# Patient Record
Sex: MALE | Race: WHITE | NOT HISPANIC OR LATINO | Employment: OTHER | URBAN - METROPOLITAN AREA
[De-identification: names, ages, dates, MRNs, and addresses within clinical notes are randomized per-mention and may not be internally consistent; named-entity substitution may affect disease eponyms.]

---

## 2023-10-31 ENCOUNTER — HOSPITAL ENCOUNTER (OUTPATIENT)
Dept: CARDIOLOGY | Facility: HOSPITAL | Age: 78
Discharge: HOME | End: 2023-10-31
Payer: MEDICARE

## 2023-10-31 DIAGNOSIS — I49.5 SICK SINUS SYNDROME (MULTI): ICD-10-CM

## 2023-10-31 DIAGNOSIS — Z95.0 PACEMAKER: ICD-10-CM

## 2023-10-31 DIAGNOSIS — Z95.0 PACEMAKER: Primary | ICD-10-CM

## 2023-10-31 PROCEDURE — 93294 REM INTERROG EVL PM/LDLS PM: CPT | Performed by: INTERNAL MEDICINE

## 2023-10-31 PROCEDURE — 93296 REM INTERROG EVL PM/IDS: CPT

## 2024-01-26 ENCOUNTER — ANCILLARY PROCEDURE (OUTPATIENT)
Dept: CARDIOLOGY | Facility: CLINIC | Age: 79
End: 2024-01-26
Payer: MEDICARE

## 2024-01-26 ENCOUNTER — OFFICE VISIT (OUTPATIENT)
Dept: CARDIOLOGY | Facility: CLINIC | Age: 79
End: 2024-01-26
Payer: MEDICARE

## 2024-01-26 VITALS
TEMPERATURE: 97.5 F | DIASTOLIC BLOOD PRESSURE: 60 MMHG | BODY MASS INDEX: 28.92 KG/M2 | HEIGHT: 70 IN | HEART RATE: 68 BPM | SYSTOLIC BLOOD PRESSURE: 124 MMHG | WEIGHT: 202 LBS

## 2024-01-26 DIAGNOSIS — I49.5 SINOATRIAL NODE DYSFUNCTION (MULTI): ICD-10-CM

## 2024-01-26 DIAGNOSIS — Z95.0 PACEMAKER: ICD-10-CM

## 2024-01-26 DIAGNOSIS — Z95.0 PACEMAKER: Primary | ICD-10-CM

## 2024-01-26 DIAGNOSIS — Z95.0 CARDIAC PACEMAKER IN SITU: ICD-10-CM

## 2024-01-26 DIAGNOSIS — I49.5 SICK SINUS SYNDROME (MULTI): ICD-10-CM

## 2024-01-26 PROCEDURE — 1036F TOBACCO NON-USER: CPT | Performed by: INTERNAL MEDICINE

## 2024-01-26 PROCEDURE — 93290 INTERROG DEV EVAL ICPMS IP: CPT | Performed by: INTERNAL MEDICINE

## 2024-01-26 PROCEDURE — 99203 OFFICE O/P NEW LOW 30 MIN: CPT | Performed by: INTERNAL MEDICINE

## 2024-01-26 PROCEDURE — 93280 PM DEVICE PROGR EVAL DUAL: CPT | Performed by: INTERNAL MEDICINE

## 2024-01-26 PROCEDURE — 1125F AMNT PAIN NOTED PAIN PRSNT: CPT | Performed by: INTERNAL MEDICINE

## 2024-01-26 RX ORDER — NAPROXEN SODIUM 220 MG/1
1 TABLET, FILM COATED ORAL DAILY
COMMUNITY
Start: 2021-02-26

## 2024-01-26 RX ORDER — ROSUVASTATIN CALCIUM 10 MG/1
10 TABLET, COATED ORAL DAILY
COMMUNITY

## 2024-01-26 RX ORDER — AMLODIPINE BESYLATE 10 MG/1
10 TABLET ORAL DAILY
COMMUNITY

## 2024-01-26 RX ORDER — FOLIC ACID 1 MG/1
1 TABLET ORAL DAILY
COMMUNITY
Start: 2021-02-26

## 2024-01-26 RX ORDER — NYSTATIN 100000 U/G
1 CREAM TOPICAL AS NEEDED
COMMUNITY
Start: 2021-03-26

## 2024-01-26 RX ORDER — ATORVASTATIN CALCIUM 80 MG/1
80 TABLET, FILM COATED ORAL DAILY
COMMUNITY
Start: 2021-02-26

## 2024-01-26 RX ORDER — SPIRONOLACTONE 25 MG/1
25 TABLET ORAL DAILY
COMMUNITY
Start: 2021-02-26

## 2024-01-26 RX ORDER — METHOTREXATE 2.5 MG/1
2.5 TABLET ORAL
COMMUNITY
Start: 2021-02-26

## 2024-01-26 RX ORDER — DOFETILIDE 0.5 MG/1
1 CAPSULE ORAL EVERY 24 HOURS
COMMUNITY
Start: 2020-11-16 | End: 2024-02-16 | Stop reason: SDUPTHER

## 2024-01-26 RX ORDER — ACETAMINOPHEN 500 MG
2000 TABLET ORAL EVERY 24 HOURS
COMMUNITY
Start: 2021-02-26

## 2024-01-26 RX ORDER — LISINOPRIL 20 MG/1
20 TABLET ORAL DAILY
COMMUNITY
Start: 2021-02-26

## 2024-01-26 RX ORDER — METOPROLOL SUCCINATE 100 MG/1
100 TABLET, EXTENDED RELEASE ORAL DAILY
COMMUNITY
Start: 2020-11-16

## 2024-01-26 RX ORDER — APIXABAN 5 MG/1
5 TABLET, FILM COATED ORAL EVERY 12 HOURS
COMMUNITY
Start: 2020-10-13 | End: 2024-05-09

## 2024-01-26 RX ORDER — ACETAMINOPHEN 325 MG/1
500 TABLET ORAL EVERY 6 HOURS PRN
COMMUNITY
Start: 2023-01-19

## 2024-01-26 ASSESSMENT — PAIN SCALES - GENERAL: PAINLEVEL: 4

## 2024-01-26 NOTE — PROGRESS NOTES
Subjective:  The patient is a 78-year-old white male, followed primarily by Dr. Fatou Gant and from a cardiology standpoint by Dr. Ramo Byrd, who presents to establish an electrophysiologist for ongoing pacemaker management.  He has a history of coronary artery disease, for which he saw Dr. Nathanael Smith for many years.  He underwent multivessel CABG at [facility] in 1997 and stenting of his LAD in 2000.  He also has a history of hypertension, hyperlipidemia, rheumatoid arthritis, atherosclerotic vascular disease with prior iliac artery stenting, and frequent premature ventricular complexes.    The patient has had tachycardia-bradycardia syndrome and underwent Medtronic DDDR pacemaker placement on 10/9/2020 by Dr. Fuentes Arauz.  In November 2020, he was hospitalized for 3 days for initiation of dofetilide therapy for his atrial fibrillation.  His burden gradually increased over the next few years, however, and he apparently underwent pulmonary vein isolation by Dr. Arauz at [facility] in January 2023, decreasing his fibrillation burden.  The patient is currently doing well, without anginal symptoms or any bothersome palpitations.    The patient is a retired [employer] .  After he retired he moved to [place] to a [employer] for senior citizens, very near [facility] [facility].    Current Outpatient Medications   Medication Sig    acetaminophen (Tylenol) 325 mg tablet Take 500 mg by mouth every 6 hours if needed.    aspirin 81 mg chewable tablet Chew 1 tablet (81 mg) once daily.    cholecalciferol (Vitamin D-3) 50 mcg (2,000 unit) capsule Take 1 capsule (50 mcg) by mouth once every 24 hours.    dofetilide (Tikosyn) 500 mcg capsule Take 1 capsule (500 mcg) by mouth once every 24 hours.    Eliquis 5 mg tablet Take 1 tablet (5 mg) by mouth every 12 hours.    folic acid (Folvite) 1 mg tablet Take 1 tablet (1 mg) by mouth once daily.    lisinopril 20 mg tablet Take 1  "tablet (20 mg) by mouth once daily.    methotrexate (Trexall) 2.5 mg tablet Take 1 tablet (2.5 mg total) by mouth 1 (one) time per week.    metoprolol succinate XL (Toprol-XL) 100 mg 24 hr tablet Take 1 tablet (100 mg) by mouth once daily.    nystatin (Mycostatin) cream 1 Application if needed.    spironolactone (Aldactone) 25 mg tablet Take 1 tablet (25 mg) by mouth once daily.    amLODIPine (Norvasc) 10 mg tablet Take 1 tablet (10 mg) by mouth once daily.    rosuvastatin (Crestor) 10 mg tablet Take 1 tablet (10 mg) by mouth once daily.     Allergies:  Iodinated contrast media and Rocephin [ceftriaxone]     Objective:  Vitals:    01/26/24 1032   BP: 124/60   Pulse: 68   Temp: 36.4 °C (97.5 °F)   Height:     1.778 m (5' 10\")  Weight: 91.6 kg (202 lb)     Exam:  Gen: Pleasant gentleman in no distress.  HEENT: No scleral icterus.  Neck: No jugular venous distention or thyromegaly.  Left subclavian pacemaker pocket: Normal in appearance.  Chest: Old median sternotomy incisional scar.  Lungs: Clear to auscultation, with no wheezes or rales.  Heart: Regular rhythm without extrasystoles, murmurs or gallops.  Abdomen: Slightly obese but benign, with no organomegaly or masses.  Extremities: Intact distal pulses; no edema.  Neuro: No focal neurologic abnormalities.  Skin: No cutaneous lesions.    Device Check:  A Medtronic pacemaker check was done today.  The unit is programmed DDDR between 60 bpm and 120 bpm and provides 67% atrial pacing and 98% ventricular pacing.  The lead parameters were acceptable.  The atrial arrhythmia burden was 0.6% over the past year.  The device battery longevity is estimated at 3.3 years.    Impressions:  1.  Coronary artery disease, status post remote CABG (1997) and PCI (2000).  He is free of anginal symptoms and apparently has normal left ventricular systolic function, I believe.  2.  Hypertension, well-controlled.  3.  Paroxysmal atrial fibrillation, status post PVI in January 2023.  He " "continues on a \"rhythm control strategy\" with dofetilide, along with beta-blockers, and has an atrial fibrillation burden of under 1%.  He has a [id number] score of at least 4 (HTN, ASVD, 2 points for age over 75), and he is appropriately anticoagulated with Eliquis.  4.  Sinus node dysfunction as part of tachycardia-bradycardia syndrome, status post Medtronic DDDR pacemaker placement in October 2020.  The device function is normal.  The patient may well have AV block as well, since he now paces in the ventricles over 98% of the time.  5.  Hyperlipidemia, on statin therapy.  He was listed as being on both atorvastatin and rosuvastatin, though I suspect that he is simply on the latter.  6.  Rheumatoid arthritis, on weekly methotrexate therapy.  7.  Premature ventricular complexes, asymptomatic.  8.  Atherosclerotic vascular disease with prior stenting of at least one of his iliac arteries, I believe.  He was followed in the past by Dr. Matt Carter, who has retired.    Recommendations:  1.  The patient's device will be followed remotely every 3 months.  2.  He will be seen in our office on an annual basis.  3.  If his atrial fibrillation burden increases to greater than 10-20% in spite of his current therapy, he would then be a reasonable candidate to switch from dofetilide to amiodarone.      David Rodríguez MD   "

## 2024-02-16 ENCOUNTER — TELEPHONE (OUTPATIENT)
Dept: PRIMARY CARE | Facility: CLINIC | Age: 79
End: 2024-02-16
Payer: MEDICARE

## 2024-02-16 DIAGNOSIS — I48.19 ATRIAL FIBRILLATION, PERSISTENT (MULTI): ICD-10-CM

## 2024-02-16 RX ORDER — DOFETILIDE 0.5 MG/1
500 CAPSULE ORAL EVERY 24 HOURS
Qty: 90 CAPSULE | Refills: 3 | Status: SHIPPED | OUTPATIENT
Start: 2024-02-16 | End: 2024-02-22 | Stop reason: SDUPTHER

## 2024-02-16 NOTE — TELEPHONE ENCOUNTER
Rx Refill Request Telephone Encounter    Name:  Antolin Garcia  :  [id number]  Medication Name:   dofetilide (Tikosyn) 500 mcg capsule   ! Out of med!    Specific Pharmacy location:    [facility]  [id number] [place] [address], [place] [address] [phone]        Date of last appointment:  2024   Date of next appointment:  2025

## 2024-02-16 NOTE — TELEPHONE ENCOUNTER
Patient left voicemail stating he received the Rx for his Tikosyn but noticed that on the bottle it says to take once a day. Patient stated has been taking twice daily. Patient would like to know if he is supposed to take once a day or twice a day. Looking back at old scripts, it used to be twice daily. Please advise

## 2024-02-22 DIAGNOSIS — I48.19 ATRIAL FIBRILLATION, PERSISTENT (MULTI): ICD-10-CM

## 2024-02-22 RX ORDER — DOFETILIDE 0.5 MG/1
500 CAPSULE ORAL 2 TIMES DAILY
Qty: 180 CAPSULE | Refills: 3 | Status: SHIPPED | OUTPATIENT
Start: 2024-02-22 | End: 2025-02-21

## 2024-02-22 NOTE — TELEPHONE ENCOUNTER
I spoke with Doctor Rodríguez, and per RDM, patient is to take 1 tablet twice a day. New Rx was sent to patient's pharmacy.    T/c to patient-lmom to inform patient

## 2024-02-22 NOTE — TELEPHONE ENCOUNTER
Patient returned call and was very appreciative. I also informed patient that a new Rx was sent to his pharmacy.

## 2024-04-30 ENCOUNTER — HOSPITAL ENCOUNTER (OUTPATIENT)
Dept: CARDIOLOGY | Facility: HOSPITAL | Age: 79
Discharge: HOME | End: 2024-04-30
Payer: MEDICARE

## 2024-04-30 DIAGNOSIS — Z95.0 PACEMAKER: ICD-10-CM

## 2024-04-30 DIAGNOSIS — I49.5 SICK SINUS SYNDROME (MULTI): ICD-10-CM

## 2024-04-30 DIAGNOSIS — Z95.0 PACEMAKER: Primary | ICD-10-CM

## 2024-04-30 PROCEDURE — 93294 REM INTERROG EVL PM/LDLS PM: CPT | Performed by: INTERNAL MEDICINE

## 2024-04-30 PROCEDURE — 93296 REM INTERROG EVL PM/IDS: CPT

## 2024-05-06 DIAGNOSIS — I48.19 ATRIAL FIBRILLATION, PERSISTENT (MULTI): Primary | ICD-10-CM

## 2024-05-09 RX ORDER — APIXABAN 5 MG/1
5 TABLET, FILM COATED ORAL 2 TIMES DAILY
Qty: 180 TABLET | Refills: 3 | Status: SHIPPED | OUTPATIENT
Start: 2024-05-09

## 2024-08-06 ENCOUNTER — HOSPITAL ENCOUNTER (OUTPATIENT)
Dept: CARDIOLOGY | Facility: HOSPITAL | Age: 79
Discharge: HOME | End: 2024-08-06
Payer: MEDICARE

## 2024-08-06 DIAGNOSIS — Z95.0 PACEMAKER: ICD-10-CM

## 2024-08-06 DIAGNOSIS — I49.5 SICK SINUS SYNDROME (MULTI): ICD-10-CM

## 2024-08-06 PROCEDURE — 93294 REM INTERROG EVL PM/LDLS PM: CPT | Performed by: INTERNAL MEDICINE

## 2024-08-06 PROCEDURE — 93296 REM INTERROG EVL PM/IDS: CPT

## 2024-11-12 ENCOUNTER — HOSPITAL ENCOUNTER (OUTPATIENT)
Dept: CARDIOLOGY | Facility: HOSPITAL | Age: 79
Discharge: HOME | End: 2024-11-12
Payer: MEDICARE

## 2024-11-12 DIAGNOSIS — Z95.0 PACEMAKER: ICD-10-CM

## 2024-11-12 DIAGNOSIS — I49.5 SICK SINUS SYNDROME (MULTI): ICD-10-CM

## 2024-11-12 PROCEDURE — 93296 REM INTERROG EVL PM/IDS: CPT

## 2024-11-12 PROCEDURE — 93294 REM INTERROG EVL PM/LDLS PM: CPT | Performed by: INTERNAL MEDICINE

## 2024-11-27 ENCOUNTER — TELEPHONE (OUTPATIENT)
Dept: CARDIOLOGY | Facility: CLINIC | Age: 79
End: 2024-11-27
Payer: MEDICARE

## 2025-01-21 ENCOUNTER — APPOINTMENT (OUTPATIENT)
Dept: CARDIOLOGY | Facility: CLINIC | Age: 80
End: 2025-01-21
Payer: MEDICARE

## 2025-02-12 DIAGNOSIS — R00.1 BRADYCARDIA ON ECG: ICD-10-CM

## 2025-02-12 DIAGNOSIS — Z95.0 PACEMAKER: Primary | ICD-10-CM

## 2025-02-14 ENCOUNTER — HOSPITAL ENCOUNTER (OUTPATIENT)
Dept: CARDIOLOGY | Facility: CLINIC | Age: 80
Discharge: HOME | End: 2025-02-14
Payer: MEDICARE

## 2025-02-14 DIAGNOSIS — I49.5 SSS (SICK SINUS SYNDROME) (MULTI): ICD-10-CM

## 2025-02-14 DIAGNOSIS — Z95.0 PACEMAKER: ICD-10-CM

## 2025-02-14 PROCEDURE — 93280 PM DEVICE PROGR EVAL DUAL: CPT | Performed by: NURSE PRACTITIONER

## 2025-02-14 PROCEDURE — 93280 PM DEVICE PROGR EVAL DUAL: CPT

## 2025-02-21 ENCOUNTER — APPOINTMENT (OUTPATIENT)
Dept: CARDIOLOGY | Facility: CLINIC | Age: 80
End: 2025-02-21
Payer: MEDICARE

## 2025-02-21 VITALS
HEART RATE: 65 BPM | WEIGHT: 202.5 LBS | OXYGEN SATURATION: 97 % | DIASTOLIC BLOOD PRESSURE: 72 MMHG | BODY MASS INDEX: 29.06 KG/M2 | SYSTOLIC BLOOD PRESSURE: 122 MMHG

## 2025-02-21 DIAGNOSIS — I48.19 ATRIAL FIBRILLATION, PERSISTENT (MULTI): Primary | ICD-10-CM

## 2025-02-21 PROCEDURE — 93000 ELECTROCARDIOGRAM COMPLETE: CPT | Performed by: INTERNAL MEDICINE

## 2025-02-21 PROCEDURE — 1159F MED LIST DOCD IN RCRD: CPT | Performed by: INTERNAL MEDICINE

## 2025-02-21 PROCEDURE — 99213 OFFICE O/P EST LOW 20 MIN: CPT | Performed by: INTERNAL MEDICINE

## 2025-02-21 PROCEDURE — G2211 COMPLEX E/M VISIT ADD ON: HCPCS | Performed by: INTERNAL MEDICINE

## 2025-02-21 ASSESSMENT — ENCOUNTER SYMPTOMS
LOSS OF SENSATION IN FEET: 0
DEPRESSION: 0
OCCASIONAL FEELINGS OF UNSTEADINESS: 0

## 2025-02-21 NOTE — PROGRESS NOTES
Referring Provider: David Rodríguez MD  Reason for Consult: Device follow-up    History of Present Illness:      Antolin Garcia is a 79 y.o. year old male patient with a history significant for CAD status post CABG and PCI to LAD, hypertension, hyperlipidemia, rheumatoid arthritis, peripheral arterial disease, paroxysmal atrial fibrillation status post PVI and tachybradycardia syndrome status post Medtronic dual-chamber pacemaker (10/9/2020) who is a former patient of Dr. Rodríguez who is here to establish care.    Patient is doing well today with no complaints.  He states that his device is functioning well.  He has not had any palpitations, shortness of breath, chest pain, lightheadedness, dizziness, or any other cardiac symptoms.  He will be going for knee surgery soon.      Focused Cardiovascular Problem List:  Tachybradycardia syndrome status post dual-chamber pacemaker (10/9/2020) with RV lead placed in the His bundle area, previously had bifascicular block  Chronically elevated RV lead thresholds  Chronically poor sensing on the RV lead  Paroxysmal atrial fibrillation status post PVI (2023, Dr. Leonard): On apixaban, metoprolol  mg daily, and dofetilide 500 mcg twice daily  CAD status post CABG and PCI to the LAD  Hypertension  Hyperlipidemia  Rheumatoid arthritis  Peripheral arterial disease : Prior iliac artery stenting      Past Medical and Surgical History:  Mr. Garcia  has no past medical history on file.    has no past surgical history on file.    Social History:  Social History     Tobacco Use    Smoking status: Former     Types: Cigarettes     Passive exposure: Past    Smokeless tobacco: Never   Substance Use Topics    Alcohol use: Yes     Comment: social        Occupational History: Retired [employer]      Relevant Family History:   No family history on file.  No relevant family history     Allergies:  Allergies   Allergen Reactions    Iodinated Contrast Media Rash     "Rocephin [Ceftriaxone] Rash        Medications:  Current Outpatient Medications   Medication Instructions    acetaminophen (TYLENOL) 500 mg, Every 6 hours PRN    amLODIPine (NORVASC) 10 mg, Daily    aspirin 81 mg chewable tablet 1 tablet, Daily    atorvastatin (LIPITOR) 80 mg, Daily    cholecalciferol (VITAMIN D-3) 2,000 Units, Every 24 hours    dofetilide (TIKOSYN) 500 mcg, oral, 2 times daily    Eliquis 5 mg, oral, 2 times daily    folic acid (FOLVITE) 1 mg, Daily    lisinopril 20 mg, Daily    methotrexate (TREXALL) 2.5 mg, Once Weekly    metoprolol succinate XL (TOPROL-XL) 100 mg, Daily    nystatin (Mycostatin) cream 1 Application, As needed    rosuvastatin (CRESTOR) 10 mg, oral, Daily    spironolactone (ALDACTONE) 25 mg, oral, Daily         Objective   Physical Exam:  Last Recorded Vitals:      11/20/2020    10:47 AM 2/26/2021     9:38 AM 1/18/2023    11:22 AM 1/26/2024    10:32 AM 2/21/2025     2:27 PM   Vitals   Systolic 112 111 130 124 122   Diastolic 71 72 80 60 72   BP Location    Left arm Left arm   Heart Rate 72 78  68 65   Temp 36.1 °C (96.9 °F) 36.1 °C (97 °F) 37 °C (98.6 °F) 36.4 °C (97.5 °F)    Resp 18 18      Height 1.778 m (5' 10\") 1.778 m (5' 10\")  1.778 m (5' 10\")    Weight (lb) 202 206  202 202.5   BMI 28.98 kg/m2 29.56 kg/m2  28.98 kg/m2 29.06 kg/m2   BSA (m2) 2.13 m2 2.15 m2  2.13 m2 2.13 m2   Visit Report    Report Report    Visit Vitals  /72 (BP Location: Left arm, Patient Position: Sitting)   Pulse 65   Wt 91.9 kg (202 lb 8 oz)   SpO2 97%   BMI 29.06 kg/m²   Smoking Status Former   BSA 2.13 m²      Gen: NAD, sitting comfortably  HEENT: NC/AT  Chest: Device in situ in left upper chest, no overlying erythema or tenderness  Card: RRR, no m/r/g  Pulm: Clear to auscultation bilaterally  Ext: No LE edema  Neuro: No focal deficits    Diagnostic Results      My Independent nterpretation of Reviewed Study(s):  Prior ECGs (reviewed and my interpretation):   2/21/2025: Atrial paced, Ventricular " paced rhythm          Relevant Labs:  Lab Results   Component Value Date    CREATININE 0.70 11/16/2020    CREATININE 0.76 11/15/2020    CREATININE 0.82 11/14/2020    K 4.0 11/16/2020    K 4.8 11/15/2020    K 4.7 11/14/2020       Assessment/Plan   Assessment and Plan:  Antolin Garcia is a 79 y.o. year old male patient who is referred for management and evaluation of tachybradycardia syndrome and bifascicular block status post dual-chamber pacemaker with the RV lead placed in the His bundle area, as well as paroxysmal atrial fibrillation.  Device interrogation shows a chronically elevated RV lead threshold as well as chronically poor sensing on the RV lead, both of which are known issues with His bundle leads.  Despite these limitations, his device has worked reasonably well and he will achieve about 7 to 8 years of battery life with the system, which is acceptable.  From the symptomatic standpoint, he is doing very well.  He has some atrial fibrillation on his pacemaker interrogation, but not very much with an overall burden of less than 1%.  He is not symptomatic during these episodes and his rate is well-controlled as it seems his underlying rhythm has progressed to complete heart block.    He has an upcoming knee surgery soon.  He can stop Eliquis at least 3 days prior to the surgery and can resume whenever it is deemed safe by the surgeon.    # Tachybradycardia syndrome and bifascicular block status post dual-chamber pacemaker with RV lead placed in the His bundle area  -Continue quarterly remote device checks and yearly in person visits  - No changes made to device programming    # Paroxysmal atrial fibrillation status post PVI  -Continue apixaban 5 mg twice daily indefinitely  - Continue dofetilide 500 mcg 2 times daily.  QTc within normal limits today.  Given his age, will consider stopping dofetilide at his next visit  - Continue metoprolol  mg daily         Return to Clinic: Patient should return to  the EP Clinic in 1 year     Thank you very much for allowing me to participate in the care of this patient. Please do not hesitate to contact me with any further questions or concerns.    Alexa Peters MD  Clinical Cardiac Electrophysiologist, [facility]    of Medicine, [facility] [employer]  Director of Atrial Fibrillation Ablation, [facility]  Director of Ventricular Arrhythmias Research, [facility]  Office Phone Number: [phone]

## 2025-04-30 DIAGNOSIS — I48.19 ATRIAL FIBRILLATION, PERSISTENT (MULTI): ICD-10-CM

## 2025-05-02 RX ORDER — DOFETILIDE 0.5 MG/1
500 CAPSULE ORAL 2 TIMES DAILY
Qty: 180 CAPSULE | Refills: 3 | Status: SHIPPED | OUTPATIENT
Start: 2025-05-02 | End: 2026-05-02

## 2025-05-19 ENCOUNTER — HOSPITAL ENCOUNTER (OUTPATIENT)
Dept: CARDIOLOGY | Facility: HOSPITAL | Age: 80
Discharge: HOME | End: 2025-05-19
Payer: MEDICARE

## 2025-05-19 DIAGNOSIS — I49.5 SICK SINUS SYNDROME (MULTI): ICD-10-CM

## 2025-05-19 DIAGNOSIS — Z95.0 CARDIAC PACEMAKER IN SITU: ICD-10-CM

## 2025-05-19 PROCEDURE — 93294 REM INTERROG EVL PM/LDLS PM: CPT | Performed by: INTERNAL MEDICINE

## 2025-05-19 PROCEDURE — 93296 REM INTERROG EVL PM/IDS: CPT

## 2025-06-24 ENCOUNTER — APPOINTMENT (OUTPATIENT)
Dept: RADIOLOGY | Facility: HOSPITAL | Age: 80
End: 2025-06-24
Payer: MEDICARE

## 2025-06-24 DIAGNOSIS — R31.29 OTHER MICROSCOPIC HEMATURIA: ICD-10-CM

## 2025-06-24 PROCEDURE — 74176 CT ABD & PELVIS W/O CONTRAST: CPT

## 2025-06-24 PROCEDURE — 74176 CT ABD & PELVIS W/O CONTRAST: CPT | Performed by: RADIOLOGY

## 2025-06-27 DIAGNOSIS — I48.19 ATRIAL FIBRILLATION, PERSISTENT (MULTI): ICD-10-CM

## 2025-07-01 RX ORDER — APIXABAN 5 MG/1
5 TABLET, FILM COATED ORAL 2 TIMES DAILY
Qty: 180 TABLET | Refills: 3 | Status: SHIPPED | OUTPATIENT
Start: 2025-07-01

## 2025-08-26 ENCOUNTER — HOSPITAL ENCOUNTER (OUTPATIENT)
Dept: CARDIOLOGY | Facility: HOSPITAL | Age: 80
Discharge: HOME | End: 2025-08-26
Payer: MEDICARE

## 2025-08-26 DIAGNOSIS — Z95.0 CARDIAC PACEMAKER IN SITU: ICD-10-CM

## 2025-08-26 DIAGNOSIS — I49.5 SICK SINUS SYNDROME (MULTI): ICD-10-CM

## 2025-08-26 PROCEDURE — 93296 REM INTERROG EVL PM/IDS: CPT

## 2026-03-06 ENCOUNTER — APPOINTMENT (OUTPATIENT)
Dept: CARDIOLOGY | Facility: CLINIC | Age: 81
End: 2026-03-06
Payer: MEDICARE